# Patient Record
Sex: FEMALE | Race: WHITE
[De-identification: names, ages, dates, MRNs, and addresses within clinical notes are randomized per-mention and may not be internally consistent; named-entity substitution may affect disease eponyms.]

---

## 2017-10-03 ENCOUNTER — HOSPITAL ENCOUNTER (OUTPATIENT)
Dept: HOSPITAL 80 - FIMAGING | Age: 72
End: 2017-10-03
Attending: INTERNAL MEDICINE
Payer: COMMERCIAL

## 2017-10-03 DIAGNOSIS — Z12.31: Primary | ICD-10-CM

## 2017-10-03 PROCEDURE — G0202 SCR MAMMO BI INCL CAD: HCPCS

## 2017-11-01 ENCOUNTER — HOSPITAL ENCOUNTER (OUTPATIENT)
Dept: HOSPITAL 80 - FED | Age: 72
Setting detail: OBSERVATION
LOS: 1 days | Discharge: HOME | End: 2017-11-02
Attending: FAMILY MEDICINE | Admitting: FAMILY MEDICINE
Payer: COMMERCIAL

## 2017-11-01 DIAGNOSIS — I10: ICD-10-CM

## 2017-11-01 DIAGNOSIS — G45.4: Primary | ICD-10-CM

## 2017-11-01 LAB
INR PPP: 0.97 (ref 0.83–1.16)
PLATELET # BLD: 219 10^3/UL (ref 150–400)
PROTHROMBIN TIME: 12.8 SEC (ref 12–15)

## 2017-11-01 PROCEDURE — G0378 HOSPITAL OBSERVATION PER HR: HCPCS

## 2017-11-01 PROCEDURE — 93005 ELECTROCARDIOGRAM TRACING: CPT

## 2017-11-01 PROCEDURE — 70551 MRI BRAIN STEM W/O DYE: CPT

## 2017-11-01 PROCEDURE — 93880 EXTRACRANIAL BILAT STUDY: CPT

## 2017-11-01 PROCEDURE — 93306 TTE W/DOPPLER COMPLETE: CPT

## 2017-11-01 PROCEDURE — 70450 CT HEAD/BRAIN W/O DYE: CPT

## 2017-11-01 NOTE — PDGENHP
History and Physical





- Chief Complaint


acute memory loss





- History of Present Illness


Patient is a 72-year-old female with hx of HLD who presented to the emergency 

department with acute short term memory problems and feeling of being lost 

while gardening today. she could not remember what she was doing or why she was 

there. She had no word-finding difficulties.  She had normal speech.  She had 

no weakness or numbness.  No visual change or dizziness.  No headache or neck 

pain.  No recent fall or trauma.  She took 2 Aspirin prior to arrival to the 

E.D. 





In the E.D. her symptoms persisted. CT brain was unremarkable for acute 

findings. An MRI of the brain is pending. She has no other neuro deficits. No 

hx of Afib. 





She has no difficulty with swallowing.





Neuro has been consulted by the E.D





she is being admitted for further w/u.





Denies CP, palpitations, leg swelling, SOB, N/V





PMhx: hypercholesterolemia





Social history: non smoker. 





FmHx: NC





History Information





- Allergies/Home Medication List


Allergies/Adverse Reactions: 








No Known Allergies Allergy (Verified 11/01/17 14:19)


 





Home Medications: 








Multivitamins [Multivitamin (*)] 1 each PO DAILY 11/01/17 [Last Taken 2 Weeks 

Ago ~10/18/17]


Rosuvastatin Calcium [Crestor 10mg (RX)] 10 mg PO HS 11/01/17 [Last Taken 10/31/

17]





I have personally reviewed and updated: medical history, social history





- Social History


Smoking Status: Never smoked





Review of Systems


Review of Systems: 





ROS: 10pt was reviewed & negative except for what was stated in HPI & below





Physical Exam


Physical Exam: 

















Temp Pulse Resp BP Pulse Ox


 


 36.5 C   62   16   152/92 H  95 


 


 11/01/17 14:31  11/01/17 15:39  11/01/17 15:39  11/01/17 15:39  11/01/17 15:39











Constitutional: no apparent distress, appears nourished


Eyes: PERRL, EOMI


Ears, Nose, Mouth, Throat: moist mucous membranes, hearing normal


Cardiovascular: regular rate and rhythym, no murmur, rub, or gallop, No edema


Respiratory: no respiratory distress, no rales or rhonchi, clear to auscultation


Gastrointestinal: normoactive bowel sounds, soft, non-tender abdomen


Skin: warm


Neurologic: AAOx3, CN II-XII Intact, No facial droop


Psychiatric: interacting appropriately, not anxious, not encephalopathic, 

thought process linear, encephalopathic





Lab Data & Imaging Review





 11/01/17 14:20





 11/01/17 14:20














WBC  9.01 10^3/uL (3.80-9.50)   11/01/17  14:20    


 


RBC  4.70 10^6/uL (4.18-5.33)   11/01/17  14:20    


 


Hgb  14.9 g/dL (12.6-16.3)   11/01/17  14:20    


 


POC Hgb  15.3 gm/dL (12.6-16.3)   11/01/17  14:27    


 


Hct  42.1 % (38.0-47.0)   11/01/17  14:20    


 


POC Hct  45 % (38-47)   11/01/17  14:27    


 


MCV  89.6 fL (81.5-99.8)   11/01/17  14:20    


 


MCH  31.7 pg (27.9-34.1)   11/01/17  14:20    


 


MCHC  35.4 g/dL (32.4-36.7)   11/01/17  14:20    


 


RDW  11.6 % (11.5-15.2)   11/01/17  14:20    


 


Plt Count  219 10^3/uL (150-400)   11/01/17  14:20    


 


MPV  9.4 fL (8.7-11.7)   11/01/17  14:20    


 


Neut % (Auto)  64.1 % (39.3-74.2)   11/01/17  14:20    


 


Lymph % (Auto)  30.0 % (15.0-45.0)   11/01/17  14:20    


 


Mono % (Auto)  4.2 % (4.5-13.0)  L  11/01/17  14:20    


 


Eos % (Auto)  1.0 % (0.6-7.6)   11/01/17  14:20    


 


Baso % (Auto)  0.4 % (0.3-1.7)   11/01/17  14:20    


 


Nucleat RBC Rel Count  0.0 % (0.0-0.2)   11/01/17  14:20    


 


Absolute Neuts (auto)  5.77 10^3/uL (1.70-6.50)   11/01/17  14:20    


 


Absolute Lymphs (auto)  2.70 10^3/uL (1.00-3.00)   11/01/17  14:20    


 


Absolute Monos (auto)  0.38 10^3/uL (0.30-0.80)   11/01/17  14:20    


 


Absolute Eos (auto)  0.09 10^3/uL (0.03-0.40)   11/01/17  14:20    


 


Absolute Basos (auto)  0.04 10^3/uL (0.02-0.10)   11/01/17  14:20    


 


Absolute Nucleated RBC  0.00 10^3/uL (0-0.01)   11/01/17  14:20    


 


Immature Gran %  0.3 % (0.0-1.1)   11/01/17  14:20    


 


Immature Gran #  0.03 10^3/uL (0.00-0.10)   11/01/17  14:20    


 


PT  12.8 SEC (12.0-15.0)   11/01/17  14:20    


 


INR  0.97  (0.83-1.16)   11/01/17  14:20    


 


APTT  26.2 SEC (23.0-38.0)   11/01/17  14:20    


 


POC Sodium  141 mEq/L (134-144)   11/01/17  14:27    


 


Sodium  141 mEq/L (134-144)   11/01/17  14:20    


 


POC Potassium  3.3 mEq/L (3.3-5.0)   11/01/17  14:27    


 


Potassium  3.6 mEq/L (3.5-5.2)   11/01/17  14:20    


 


POC Chloride  105 mEq/L ()   11/01/17  14:27    


 


Chloride  104 mEq/L ()   11/01/17  14:20    


 


Carbon Dioxide  23 mEq/l (22-31)   11/01/17  14:20    


 


Anion Gap  14 mEq/L (8-16)   11/01/17  14:20    


 


POC BUN  17 mg/dL (7-23)   11/01/17  14:27    


 


BUN  16 mg/dL (7-23)   11/01/17  14:20    


 


Creatinine  0.8 mg/dL (0.6-1.0)   11/01/17  14:20    


 


POC Creatinine  0.9 mg/dL (0.6-1.0)   11/01/17  14:27    


 


Estimated GFR  > 60   11/01/17  14:20    


 


Glucose  129 mg/dL ()  H  11/01/17  14:20    


 


POC Glucose  128 mg/dL ()  H  11/01/17  14:27    


 


Calcium  9.7 mg/dL (8.5-10.4)   11/01/17  14:20    


 


Troponin I  < 0.012 ng/mL (0.000-0.034)   11/01/17  14:20    











Assessment & Plan


Assessment: 








#Acute short term memory loss


#?TIA


#HLD





Plan:


-Neuro to consult, await reccs


-Await MRI


-TTE


-Corotid US


-Check RF, lipid panel


-cont Statin


-Schedule low dose Aspirin


-Slight HTN noted, will allow permissive HTN


-Telemetry


-SCD's

## 2017-11-01 NOTE — EDPHY
H & P


Time Seen by Provider: 11/01/17 14:34


HPI/ROS: 





CHIEF COMPLAINT:  Memory difficulties





HISTORY OF PRESENT ILLNESS:  Patient is a 72-year-old female who was treated 

for high cholesterol who presents emergency department with memory 

difficulties.  The patient states that she was outside with her .  She 

came to him and stated that she felt "funny."  He states that she seemed 

slightly "foggy."  Per report, she had no word-finding difficulties.  She had 

normal speech.  She had no weakness or numbness.  No visual change or 

dizziness.  No headache or neck pain.  No recent fall or trauma.  Patient 

states that she feels as though she is having difficulty remembering things.  

She took an aspirin but then could not remember that she took an aspirin and 

took a 2nd tablet.  No previous stroke.





REVIEW OF SYSTEMS:  


My complete review of systems is negative except as mentioned in the HPI.


Past Medical/Surgical History: 





Includes hypercholesterolemia





Past surgical history:  Noncontributory





Social history:  The patient is here with her .  She does not smoke.


Physical Exam: 





Vitals noted


GENERAL:  Well-appearing, in no acute distress, alert.


HEENT:  Eyes normal to inspection, normal pharynx, no signs of dehydration.


NECK:  No thyromegaly, no lymphadenopathy, supple.


RESPIRATORY:  Clear to auscultation bilaterally, no rales, rhonchi or wheezing.


CVS:  Regular rate and rhythm, no rubs, murmurs, or gallops.


ABDOMEN:  Soft, nontender, nondistended, no organomegaly.


BACK:  Normal to inspection, no CVA tenderness.


SKIN:  Normal color, no rash, warm, dry.  No pallor.


EXTREMITIES:  No pedal edema, no calf tenderness, no Homans sign or cords, no 

joint swelling.


NEURO/PSYCH:  


Higher functions:  Alert and Oriented x3.  Normal speech and cognition.  Normal 

mood and affect.  Normal memory 3/3


Cranial nerves:  Normal as tested.


Cerebellar:  Normal as tested.  Good finger to nose, good heel-to-shin, normal 

gait.


Peripheral exam:  Normal motor exam.  Normal sensation.  Normal reflexes.





NIHSS = 0


Constitutional: 


 Initial Vital Signs











Temperature (C)  36.5 C   11/01/17 14:31


 


Heart Rate  71   11/01/17 14:31


 


Respiratory Rate  16   11/01/17 14:31


 


Blood Pressure  184/96 H  11/01/17 14:31


 


O2 Sat (%)  97   11/01/17 14:31








 











O2 Delivery Mode               Room Air














Allergies/Adverse Reactions: 


 





No Known Allergies Allergy (Verified 11/01/17 14:19)


 








Home Medications: 














 Medication  Instructions  Recorded


 


Multivitamins [Multivitamin (*)] 1 each PO DAILY 11/01/17


 


Rosuvastatin Calcium [Crestor 10mg 10 mg PO HS 11/01/17





(RX)]  














Medical Decision Making





- Diagnostics


Imaging Results: 


 Imaging Impressions





Head CT  11/01/17 14:45


Impression:


1. No acute intracranial findings.


2. Diffuse cerebral atrophy with periventricular and subcortical low 

attenuation consistent with chronic microvascular ischemic gliosis.


 


Findings discussed with AARON JAQUELINE CAPELLAN 11/1/2017 at 15:30. 


 


 








Brain MRI  11/01/17 15:33


Impression:  


1. No acute intracranial findings.


2. Atrophy with white matter change most likely related to chronic 

microvascular ischemic gliosis.


 


Findings discussed with AARON JAQUELINE CAPELLAN 11/1/2017 at 17:05. 


 


 











ED Course/Re-evaluation: 





I met the patient on arrival.  I performed her initial evaluation.  Her NIHSS = 

0.  I did not call a stroke alert as she had a normal neuro exam.  I discussed 

the plan with the patient and her .  I answered all her questions.  An 

IV was placed.  Laboratory studies and EKG were obtained.  Head CT without IV 

contrast was ordered.





I-STAT:  Sodium 141, potassium 3.3, glucose 128, creatinine 0.9, hematocrit 45





IV patient's laboratory studies.





15 30:  Head CT:  No acute disease.  Please refer the dictated report by Dr. Cervantes.





I discussed the results with the patient.  I answered all her questions.  On 

recheck she had no focal neurologic deficits.  An MRI was ordered.





Sinus rhythm at 60. Left axis deviation.





I discussed the case with Dr. Pappas.  I also discussed the case with Dr. Owen 

from Neurology.





17 20:  I discussed case with Radiology.  No acute disease noted on the MRI.


Differential Diagnosis: 





My differential includes but is not limited to ischemic CVA, hemorrhagic CVA, 

transient global amnesia, TIA, dissection, aneurysm, electrolyte abnormality, 

sugar abnormality, ACS





- Data Points


Laboratory Results: 


 Laboratory Results





 11/01/17 14:20 





 11/01/17 14:20 





 











  11/01/17 11/01/17 11/01/17





  14:27 14:20 14:20


 


WBC      





    


 


RBC      





    


 


Hgb      





    


 


POC Hgb  15.3 gm/dL gm/dL    





   (12.6-16.3)   


 


Hct      





    


 


POC Hct  45 % %    





   (38-47)   


 


MCV      





    


 


MCH      





    


 


MCHC      





    


 


RDW      





    


 


Plt Count      





    


 


MPV      





    


 


Neut % (Auto)      





    


 


Lymph % (Auto)      





    


 


Mono % (Auto)      





    


 


Eos % (Auto)      





    


 


Baso % (Auto)      





    


 


Nucleat RBC Rel Count      





    


 


Absolute Neuts (auto)      





    


 


Absolute Lymphs (auto)      





    


 


Absolute Monos (auto)      





    


 


Absolute Eos (auto)      





    


 


Absolute Basos (auto)      





    


 


Absolute Nucleated RBC      





    


 


Immature Gran %      





    


 


Immature Gran #      





    


 


PT      12.8 SEC SEC





     (12.0-15.0) 


 


INR      0.97 





     (0.83-1.16) 


 


APTT      26.2 SEC SEC





     (23.0-38.0) 


 


POC Sodium  141 mEq/L mEq/L    





   (134-144)   


 


Sodium    141 mEq/L mEq/L  





    (134-144)  


 


POC Potassium  3.3 mEq/L mEq/L    





   (3.3-5.0)   


 


Potassium    3.6 mEq/L mEq/L  





    (3.5-5.2)  


 


POC Chloride  105 mEq/L mEq/L    





   ()   


 


Chloride    104 mEq/L mEq/L  





    ()  


 


Carbon Dioxide    23 mEq/l mEq/l  





    (22-31)  


 


Anion Gap    14 mEq/L mEq/L  





    (8-16)  


 


POC BUN  17 mg/dL mg/dL    





   (7-23)   


 


BUN    16 mg/dL mg/dL  





    (7-23)  


 


Creatinine    0.8 mg/dL mg/dL  





    (0.6-1.0)  


 


POC Creatinine  0.9 mg/dL mg/dL    





   (0.6-1.0)   


 


Estimated GFR    > 60   





    


 


Glucose    129 mg/dL H mg/dL  





    ()  


 


POC Glucose  128 mg/dL H mg/dL    





   ()   


 


Calcium    9.7 mg/dL mg/dL  





    (8.5-10.4)  


 


Troponin I    < 0.012 ng/mL ng/mL  





    (0.000-0.034)  














  11/01/17





  14:20


 


WBC  9.01 10^3/uL 10^3/uL





   (3.80-9.50) 


 


RBC  4.70 10^6/uL 10^6/uL





   (4.18-5.33) 


 


Hgb  14.9 g/dL g/dL





   (12.6-16.3) 


 


POC Hgb  





  


 


Hct  42.1 % %





   (38.0-47.0) 


 


POC Hct  





  


 


MCV  89.6 fL fL





   (81.5-99.8) 


 


MCH  31.7 pg pg





   (27.9-34.1) 


 


MCHC  35.4 g/dL g/dL





   (32.4-36.7) 


 


RDW  11.6 % %





   (11.5-15.2) 


 


Plt Count  219 10^3/uL 10^3/uL





   (150-400) 


 


MPV  9.4 fL fL





   (8.7-11.7) 


 


Neut % (Auto)  64.1 % %





   (39.3-74.2) 


 


Lymph % (Auto)  30.0 % %





   (15.0-45.0) 


 


Mono % (Auto)  4.2 % L %





   (4.5-13.0) 


 


Eos % (Auto)  1.0 % %





   (0.6-7.6) 


 


Baso % (Auto)  0.4 % %





   (0.3-1.7) 


 


Nucleat RBC Rel Count  0.0 % %





   (0.0-0.2) 


 


Absolute Neuts (auto)  5.77 10^3/uL 10^3/uL





   (1.70-6.50) 


 


Absolute Lymphs (auto)  2.70 10^3/uL 10^3/uL





   (1.00-3.00) 


 


Absolute Monos (auto)  0.38 10^3/uL 10^3/uL





   (0.30-0.80) 


 


Absolute Eos (auto)  0.09 10^3/uL 10^3/uL





   (0.03-0.40) 


 


Absolute Basos (auto)  0.04 10^3/uL 10^3/uL





   (0.02-0.10) 


 


Absolute Nucleated RBC  0.00 10^3/uL 10^3/uL





   (0-0.01) 


 


Immature Gran %  0.3 % %





   (0.0-1.1) 


 


Immature Gran #  0.03 10^3/uL 10^3/uL





   (0.00-0.10) 


 


PT  





  


 


INR  





  


 


APTT  





  


 


POC Sodium  





  


 


Sodium  





  


 


POC Potassium  





  


 


Potassium  





  


 


POC Chloride  





  


 


Chloride  





  


 


Carbon Dioxide  





  


 


Anion Gap  





  


 


POC BUN  





  


 


BUN  





  


 


Creatinine  





  


 


POC Creatinine  





  


 


Estimated GFR  





  


 


Glucose  





  


 


POC Glucose  





  


 


Calcium  





  


 


Troponin I  





  











Medications Given: 


 








Discontinued Medications





Sodium Chloride (Ns)  500 mls @ 500 mls/hr IV EDNOW ONE


   PRN Reason: Protocol


   Stop: 11/01/17 15:44


   Last Admin: 11/01/17 15:35 Dose:  500 mls





Point of Care Test Results: 


 











  11/01/17





  14:27


 


POC Sodium  141


 


POC Potassium  3.3


 


POC Chloride  105


 


POC BUN  17


 


POC Creatinine  0.9


 


POC Glucose  128 H














Departure





- Departure


Disposition: Home, Routine, Self-Care


Clinical Impression: 


 Memory loss





Condition: Good

## 2017-11-01 NOTE — CPEKG
Heart Rate: 60

RR Interval: 1000

P-R Interval: 144

QRSD Interval: 92

QT Interval: 436

QTC Interval: 436

P Axis: 45

QRS Axis: -31

T Wave Axis: -18

EKG Severity - BORDERLINE ECG -

EKG Impression: SINUS RHYTHM

EKG Impression: LEFT AXIS DEVIATION

EKG Impression: BORDERLINE T ABNORMALITIES, DIFFUSE LEADS

Electronically Signed By: Barbara Ray 01-Nov-2017 20:57:49

## 2017-11-02 VITALS — OXYGEN SATURATION: 94 % | RESPIRATION RATE: 14 BRPM

## 2017-11-02 VITALS — SYSTOLIC BLOOD PRESSURE: 115 MMHG | TEMPERATURE: 98 F | HEART RATE: 58 BPM | DIASTOLIC BLOOD PRESSURE: 67 MMHG

## 2017-11-02 NOTE — ECHO
[email or web address]





[address] [address] 

Main: [phone] 



Fax: 



Transthoracic Echocardiogram 

Name:             ZARI PANDYA                           MR#:

[id number]

Study Date:       2017                              Study Time:

11:27 AM

YOB: 1945                              Age:

72 year(s)

Height:           160 cm (63 in.)                         Weight:

56.7 kg (125 lb.)

BSA:              1.58 m2                                 Gender:

Female

Examination:      Echo with Agitated Saline               Indication:

ischemic stroke; r/o PFO

Image Quality:    Adequate                                Contrast:

I.V. dose of agitated saline

Requested by:     Keagan Pappas                           BP:

/

Heart Rate:                                               Rhythm: 

Indication:       ischemic stroke; r/o PFO 



Procedure Staff 

Ultrasound Technician:   Desi Velasquez Physician:       Bassam Beckham 

Requesting Provider: 



Conclusions:           Normal study 



Measurements: 

Chambers                     Valvular Assessment AV/MV

Valvular Assessment TV/PV



Normal                                    Normal

Normal

Name         Value     Range              Name         Value Range

Name           Value Range

Ao Elena (MM): 1.8 cm    (2.2 cm-3.7            AV Vmax:     1.59 m/s (1

m/s-1.7        PV Vmax:       0.85 m/s (0.6 m/s-0.9



cm)                                   m/s)

m/s)

IVSd (2D):   0.9 cm (0.6 cm-1.1               AV maxPG:    10 mmHg ( -

)          PV PGmax:      3  mmHg ( - )



cm)                LVOT Vmax:   1.28 m/s (0.7 m/s-1.1 

LVDd (2D):   3.8 cm    (3.9 cm-5.3                               m/s)





cm)                MV E Vmax:   0.77 m/s ( - )  

LVDs (2D):   2.3 cm    (2.1 cm-4              MV A Vmax:   0.82 m/s (

- )



cm)                MV E/A:      0.94  ( - )  

LVPWd (2D):  0.8 cm    ( - )   

LVEF (BP):   73 %      (>=55 %)   

RVDd(2D):    2.4 cm    (1.9 cm-3.8 



cmmm)  



Continued Measurements: 

Chambers                     Valvular Assessment AV/MV

Valvular Assessment TV/PV



Name                     Value            Name

Value      Name                    Value

LADs Lon.1 cm                MV DecTime:

222 m/s       CVP (est.):             5 mmHg

LA Area:                8.5 cm2           MV E' Septal:         0.09

m/s

LA Volume:              20 ml             MV E/E' Septal:       8.60



LA Volume Index:        12.7 ml/m2        MV E/E' Lateral:      7.80





Patient: ZARI PANDYA                         MRN: [id number]

Study Date: 2017   Page 1 of 2

11:27 AM 









Findings: Left Ventricle: 

Normal size left ventricle. No LV hypertrophy. Normal global systolic

LV function. EF is 73 %. No

regional wall motion abnormality. Normal diastolic LV function.  

Right Ventricle: 

Normal size right ventricle. Normal RV function.  

Left Atrium: 

The left atrium is normal in size. An agitated saline study was

performed and was negative for

intracardiac shunting.  

Right Atrium: 

The right atrium is normal in size.  

Mitral Valve: 

The mitral valve is normal in appearance and function. There is no

mitral valve regurgitation.

Aortic Valve: 

The aortic valve is normal in appearance and function. There is no

aortic valve regurgitation. No

aortic valve stenosis is present.  

Tricuspid Valve: 

The tricuspid valve is normal in appearance and function. There is no

significant tricuspid valve

regurgitation. Pulmonary artery pressure is not obtained due to

inadequate TR jet.

Pulmonic Valve: 

The pulmonic valve is normal in appearance and function.  

Aorta: 

The aorta is normal. Normal size aortic root measuring 1.8 cm.  

Pericardium: 

No pericardial effusion. 







Electronically signed by Bassam Beckham on 2017 at 12:18 PM 

(No Signature Object) 



Patient: ZARI PANDYA                         MRN: [id number]

Study Date: 2017   Page 2 of 2

11:27 AM 







[id number]

## 2017-11-02 NOTE — ASMTCASEMG
Living Arrangements

 

What is your living           Answers:  With Spouse                           

arrangement? Who do you                                                       

live with?                                                                    

Type Of Residence

 

What kind of residence do     Answers:  House                                 

you live in?                                                                  

Discharge Plan Comments

 

Coordination Status           

Comments                      

Notes:

Pt has been cleared from neurology to d/c home. CM spoke w/ PT and pt can go home independent. CM 

met w/ pt dispo planning. Pt does not have any needs at this time. CM available for d/c needs. 

 

Date Signed:  11/02/2017 11:42 AM

Electronically Signed By:RK Ballesteros

## 2017-11-02 NOTE — ECHO
[email or web address]





[address] [address] 

Main: [phone] 



Fax: 



Transthoracic Echocardiogram 

Name:             ZARI PANDYA                           MR#:

[id number]

Study Date:       2017                              Study Time:

11:27 AM

YOB: 1945                              Age:

72 year(s)

Height:           160 cm (63 in.)                         Weight:

56.7 kg (125 lb.)

BSA:              1.58 m2                                 Gender:

Female

Examination:      Echo with Agitated Saline               Indication:

ischemic stroke; r/o PFO

Image Quality:    Adequate                                Contrast:

I.V. dose of agitated saline

Requested by:     Keagan Pappas                           BP:

/

Heart Rate:                                               Rhythm: 

Indication:       ischemic stroke; r/o PFO 



Procedure Staff 

Ultrasound Technician:   Desi Velasquez Physician:       Bassam Beckham 

Requesting Provider: 



Conclusions:           Normal study 



Measurements: 

Chambers                     Valvular Assessment AV/MV

Valvular Assessment TV/PV



Normal                                    Normal

Normal

Name         Value     Range              Name         Value Range

Name           Value Range

Ao Elena (MM): 1.8 cm    (2.2 cm-3.7            AV Vmax:     1.59 m/s (1

m/s-1.7        PV Vmax:       0.85 m/s (0.6 m/s-0.9



cm)                                   m/s)

m/s)

IVSd (2D):   0.9 cm (0.6 cm-1.1               AV maxPG:    10 mmHg ( -

)          PV PGmax:      3  mmHg ( - )



cm)                LVOT Vmax:   1.28 m/s (0.7 m/s-1.1 

LVDd (2D):   3.8 cm    (3.9 cm-5.3                               m/s)





cm)                MV E Vmax:   0.77 m/s ( - )  

LVDs (2D):   2.3 cm    (2.1 cm-4              MV A Vmax:   0.82 m/s (

- )



cm)                MV E/A:      0.94  ( - )  

LVPWd (2D):  0.8 cm    ( - )   

LVEF (BP):   73 %      (>=55 %)   

RVDd(2D):    2.4 cm    (1.9 cm-3.8 



cmmm)  



Continued Measurements: 

Chambers                     Valvular Assessment AV/MV

Valvular Assessment TV/PV



Name                     Value            Name

Value      Name                    Value

LADs Lon.1 cm                MV DecTime:

222 m/s       CVP (est.):             5 mmHg

LA Area:                8.5 cm2           MV E' Septal:         0.09

m/s

LA Volume:              20 ml             MV E/E' Septal:       8.60



LA Volume Index:        12.7 ml/m2        MV E/E' Lateral:      7.80





Patient: ZARI PANDYA                         MRN: [id number]

Study Date: 2017   Page 1 of 2

11:27 AM 









Findings: Left Ventricle: 

Normal size left ventricle. No LV hypertrophy. Normal global systolic

LV function. EF is 73 %. No

regional wall motion abnormality. Normal diastolic LV function.  

Right Ventricle: 

Normal size right ventricle. Normal RV function.  

Left Atrium: 

The left atrium is normal in size. An agitated saline study was

performed and was negative for

intracardiac shunting.  

Right Atrium: 

The right atrium is normal in size.  

Mitral Valve: 

The mitral valve is normal in appearance and function. There is no

mitral valve regurgitation.

Aortic Valve: 

The aortic valve is normal in appearance and function. There is no

aortic valve regurgitation. No

aortic valve stenosis is present.  

Tricuspid Valve: 

The tricuspid valve is normal in appearance and function. There is no

significant tricuspid valve

regurgitation. Pulmonary artery pressure is not obtained due to

inadequate TR jet.

Pulmonic Valve: 

The pulmonic valve is normal in appearance and function.  

Aorta: 

The aorta is normal. Normal size aortic root measuring 1.8 cm.  

Pericardium: 

No pericardial effusion. 







Electronically signed by Bassam Beckham on 2017 at 12:18 PM 

(No Signature Object) 



Patient: ZARI PANDYA                         MRN: [id number]

Study Date: 2017   Page 2 of 2

11:27 AM 







[id number]

## 2017-11-02 NOTE — GDS
[f 
rep st]



                                                             DISCHARGE SUMMARY





DISCHARGE DIAGNOSIS:  

1.  Transient global amnesia.

2.  Hypertension.



BRIEF HISTORY:  The patient is a 72-year-old female with a history of 
hyperlipidemia.  She presented to the emergency department with acute short-
term memory problems and felt like she was lost while gardening.  She did not 
remember what she was doing or why she was there.  She had no word-finding 
difficulties.  She was seen and evaluated by Neurology who noted that she has 
transient global amnesia.  An MRI of her brain showed nothing acute.  Her 
carotid Doppler showed no flow-limiting stenosis.  Recommendations are no 
driving x2 week and seizure precautions.



HOSPITAL COURSE BY PROBLEM:  

1.  Transient global amnesia.  Her symptoms have resolved.  On the telemetry 
monitor, she has been in sinus rhythm.  To follow up with Dr. Kelly and get an 
EEG.

2.  Hypertension.  Follow up with her PCP.  It is stable this afternoon.



DISCHARGE CONDITION:  Stable. 



VITAL SIGNS: Blood pressure is 115/67, respiratory rate is 14, pulse is 58, 
temperature 36.7 Celsius, O2 sats on room air 94%.



MEDICATIONS AT DISCHARGE:  Please see the EMR.



DISCHARGE INSTRUCTIONS:  

1.  No driving x2 weeks.

2.  To take seizure precautions.  For example, no climbing up ladders and 
swimming alone.

3.  To follow up with Dr. Kelly.  She will need EEG evaluation.



Copy requested to:

Dr. Robin Omalley



Job #:  [id number]

MTDD

## 2017-11-02 NOTE — HOSPPROG
Hospitalist Progress Note


Assessment/Plan: 





Patient is a 72-year-old female with hx of HLD who presented to the emergency 

department with acute short term memory problems and feeling of being lost 

while gardening today. She could not remember what she was doing or why she was 

there. She had no word-finding difficulties.  She had normal speech.  She had 

no weakness or numbness.  No visual change or dizziness.  No headache or neck 

pain.  No recent fall or trauma.  She took 2 Aspirin prior to arrival to the 

E.D. Today is my first encounter w the patient/ chart reviewed. Reviewed her 

care with Dr Kelly.





*Transient global amnesia


   MRI of brain shows nothing acute


   carotid dopplers show no flow limiting stenosis


   recommendation is no driving x 2 weeks and seizure precautions


   to f/u with Dr Kelly and get an EEG 


   reviewed telemetry monitor/ she has been in sinus/ no afib or ectopy





*HTN


   should f/u with PCP


   stable this afternooon





*Plan: dc home





Subjective: Marti has no complaints.


Objective: 


 Vital Signs











Temp Pulse Resp BP Pulse Ox


 


 36.7 C   58 L  14   115/67   94 


 


 11/02/17 10:54  11/02/17 11:02  11/02/17 10:54  11/02/17 11:02  11/02/17 10:54








 











 11/01/17 11/02/17 11/03/17





 05:59 05:59 05:59


 


Intake Total  450 500


 


Balance  450 500








 











PT  12.8 SEC (12.0-15.0)   11/01/17  14:20    


 


INR  0.97  (0.83-1.16)   11/01/17  14:20    














- Physical Exam


Constitutional: no apparent distress, appears nourished, not in pain


Eyes: PERRL


Ears, Nose, Mouth, Throat: hearing normal


Cardiovascular: regular rate and rhythym


Respiratory: no respiratory distress


Skin: warm


Musculoskeletal: full muscle strength


Neurologic: AAOx3


Psychiatric: interacting appropriately





ICD10 Worksheet


Patient Problems: 


 Problems











Problem Status Onset


 


Memory loss Acute

## 2017-11-02 NOTE — GCON
[f 
rep st]



                                                                    CONSULTATION





NEUROLOGY CONSULTATION



DATE OF CONSULTATION:  11/02/2017



REFERRING PHYSICIAN:  Keagan Pappas MD





BILLING INFORMATION: seventy  total minutes on the floor today and reviewing 
records, neuro imaging and in direct counseling with the patient along with 
coordination of care.



CHIEF COMPLAINT:  Amnesia.



HISTORY OF PRESENT ILLNESS:  The patient is a very pleasant 72-year-old lady 
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 



She recently came home from a trip from [place] and states she was 
dehydrated.  Then yesterday morning, she and her  were working quite 
vigorously with an orbital elmira to sand off the paint from their external 
home to repaint it themselves.  She states she was applying a great deal of 
pressure with her chest and upper extremities while standing on a ladder using 
her core muscles along with repetitive kneeling and standing up.  Certainly, 
she had repetitive Valsalva type maneuvers. 



Then, a little later in the morning, she began having a feeling of "fuzziness" 
and minimal headache symptoms.  Her  then noticed that the patient was 
unable to form new memories for a period of around 3-5 hours in total.  She 
would repeat questions such as why are we having lunch although they just 
spoken about it a moment previously and repeat the same questions over and 
over.  At no point - did she have loss or alteration of consciousness.



She was brought to the ER and, after 1 or 2 hours in the ER, the symptoms 
completely resolved.  There was never any focal weakness, sensory loss, 
language or motor speech disturbance at any time during these events.  The 
patient had an MRI brain which showed no acute findings.  No acute infarct or 
hemorrhage.  The brain looks fairly healthy in terms of microvascular changes, 
there is minimal.  She had a carotid ultrasound which showed no flow-limiting 
stenosis in the carotid system.  There is some minimal plaque.  She is 
essentially resolved overnight and doing well.



REVIEW OF SYSTEMS:  Ten-point review of system was done and only pertinent to 
HPI.



PAST MEDICAL HISTORY:  Please see Dr. Pappas's history and physical.



SOCIAL HISTORY:  Please see Dr. Pappas's history and physical.



HOME MEDICATIONS:  Please see Dr. Pappas's history and physical.



ALLERGIES:  Please see Dr. Pappas's history and physical



PHYSICAL EXAMINATION:  VITAL SIGNS:  Blood pressure is 127/77, temperature 36.9
, heart rate is 64, O2 saturation is 95%.  GENERAL:  In no acute distress.  
Very pleasant.  NEUROLOGIC:  Higher mental function.  She names 5/5, follows 
commands 5/5, and repeats 5/5.  No aphasia.  She is lucid.  Cranial nerve exam 
normal 2 through 7, 11, and 12.  No dysarthria or any other abnormal findings.  
Motor exam normal strength tone and DTRs throughout.  Sensory normal to light 
touch throughout.  Coordination normal.



IMPRESSION AND PLAN:  



1. Transient global amnesia. 



The patient's clinical history best fits with transient global amnesia (TGA).  
I am reassured that there were no focal motor, sensory, language or motor 
speech symptoms to suggest an acute neurovascular syndrome.  Moreover, her MRI 
brain was negative in terms of diffusion-weighted images hours into symptom 
onset.  All of this would support the diagnosis of transient global amnesia.  
She has no neurologic history such as seizures or migraines.  Her activity of 
vigorous work, sanding the paint from their home is not an uncommon historical 
feature preceding an episode of transient global amnesia.  She was counseled to 
such.  We will put her on 2 weeks of driving restrictions and seizure 
precautions for safety.  I will arrange for an outpatient EEG and follow up 
after that study is complete to review the results.  We will do this to exclude 
any epileptiform abnormalities.  From my standpoint, she can discharge from the 
hospital any time back to home.  We will arrange outpatient followup.  Thank 
for this consultation.



The patient will have close follow-up with PCP as well to monitor her blood 
pressures.  Her higher blood pressures in the ED may have been reactive rather 
than causative.  But certainly this needs to be followed up on a long-term 
basis with her primary care physician.  She is agreeable.



Job #:  [id number]

MTDD

## 2017-11-02 NOTE — ECHO
[email or web address]





[address] [address] 

Main: [phone] 



Fax: 



Transthoracic Echocardiogram 

Name:             ZARI PANDYA                           MR#:

[id number]

Study Date:       2017                              Study Time:

11:27 AM

YOB: 1945                              Age:

72 year(s)

Height:           160 cm (63 in.)                         Weight:

56.7 kg (125 lb.)

BSA:              1.58 m2                                 Gender:

Female

Examination:      Echo with Agitated Saline               Indication:

ischemic stroke; r/o PFO

Image Quality:    Adequate                                Contrast:

I.V. dose of agitated saline

Requested by:     Keagan Pappas                           BP:

/

Heart Rate:                                               Rhythm: 

Indication:       ischemic stroke; r/o PFO 



Procedure Staff 

Ultrasound Technician:   Desi Velasquez Physician:       Bassam Beckham 

Requesting Provider: 



Conclusions:           Normal study 



Measurements: 

Chambers                     Valvular Assessment AV/MV

Valvular Assessment TV/PV



Normal                                    Normal

Normal

Name         Value     Range              Name         Value Range

Name           Value Range

Ao Elena (MM): 1.8 cm    (2.2 cm-3.7            AV Vmax:     1.59 m/s (1

m/s-1.7        PV Vmax:       0.85 m/s (0.6 m/s-0.9



cm)                                   m/s)

m/s)

IVSd (2D):   0.9 cm (0.6 cm-1.1               AV maxPG:    10 mmHg ( -

)          PV PGmax:      3  mmHg ( - )



cm)                LVOT Vmax:   1.28 m/s (0.7 m/s-1.1 

LVDd (2D):   3.8 cm    (3.9 cm-5.3                               m/s)





cm)                MV E Vmax:   0.77 m/s ( - )  

LVDs (2D):   2.3 cm    (2.1 cm-4              MV A Vmax:   0.82 m/s (

- )



cm)                MV E/A:      0.94  ( - )  

LVPWd (2D):  0.8 cm    ( - )   

LVEF (BP):   73 %      (>=55 %)   

RVDd(2D):    2.4 cm    (1.9 cm-3.8 



cmmm)  



Continued Measurements: 

Chambers                     Valvular Assessment AV/MV

Valvular Assessment TV/PV



Name                     Value            Name

Value      Name                    Value

LADs Lon.1 cm                MV DecTime:

222 m/s       CVP (est.):             5 mmHg

LA Area:                8.5 cm2           MV E' Septal:         0.09

m/s

LA Volume:              20 ml             MV E/E' Septal:       8.60



LA Volume Index:        12.7 ml/m2        MV E/E' Lateral:      7.80





Patient: ZARI PANDYA                         MRN: [id number]

Study Date: 2017   Page 1 of 2

11:27 AM 









Findings: Left Ventricle: 

Normal size left ventricle. No LV hypertrophy. Normal global systolic

LV function. EF is 73 %. No

regional wall motion abnormality. Normal diastolic LV function.  

Right Ventricle: 

Normal size right ventricle. Normal RV function.  

Left Atrium: 

The left atrium is normal in size. An agitated saline study was

performed and was negative for

intracardiac shunting.  

Right Atrium: 

The right atrium is normal in size.  

Mitral Valve: 

The mitral valve is normal in appearance and function. There is no

mitral valve regurgitation.

Aortic Valve: 

The aortic valve is normal in appearance and function. There is no

aortic valve regurgitation. No

aortic valve stenosis is present.  

Tricuspid Valve: 

The tricuspid valve is normal in appearance and function. There is no

significant tricuspid valve

regurgitation. Pulmonary artery pressure is not obtained due to

inadequate TR jet.

Pulmonic Valve: 

The pulmonic valve is normal in appearance and function.  

Aorta: 

The aorta is normal. Normal size aortic root measuring 1.8 cm.  

Pericardium: 

No pericardial effusion. 







Electronically signed by Bassam Beckham on 2017 at 12:18 PM 

(No Signature Object) 



Patient: ZARI PANDYA                         MRN: [id number]

Study Date: 2017   Page 2 of 2

11:27 AM 







[id number]

## 2017-11-02 NOTE — GCON
[f 
rep st]



                                                                    CONSULTATION





NEUROLOGY CONSULTATION



DATE OF CONSULTATION:  11/02/2017



REFERRING PHYSICIAN:  Keagan Pappas MD





BILLING INFORMATION: seventy  total minutes on the floor today and reviewing 
records, neuro imaging and in direct counseling with the patient along with 
coordination of care.



CHIEF COMPLAINT:  Amnesia.



HISTORY OF PRESENT ILLNESS:  The patient is a very pleasant 72-year-old lady 
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 



She recently came home from a trip from [place] and states she was 
dehydrated.  Then yesterday morning, she and her  were working quite 
vigorously with an orbital elmira to sand off the paint from their external 
home to repaint it themselves.  She states she was applying a great deal of 
pressure with her chest and upper extremities while standing on a ladder using 
her core muscles along with repetitive kneeling and standing up.  Certainly, 
she had repetitive Valsalva type maneuvers. 



Then, a little later in the morning, she began having a feeling of "fuzziness" 
and minimal headache symptoms.  Her  then noticed that the patient was 
unable to form new memories for a period of around 3-5 hours in total.  She 
would repeat questions such as why are we having lunch although they just 
spoken about it a moment previously and repeat the same questions over and 
over.  At no point - did she have loss or alteration of consciousness.



She was brought to the ER and, after 1 or 2 hours in the ER, the symptoms 
completely resolved.  There was never any focal weakness, sensory loss, 
language or motor speech disturbance at any time during these events.  The 
patient had an MRI brain which showed no acute findings.  No acute infarct or 
hemorrhage.  The brain looks fairly healthy in terms of microvascular changes, 
there is minimal.  She had a carotid ultrasound which showed no flow-limiting 
stenosis in the carotid system.  There is some minimal plaque.  She is 
essentially resolved overnight and doing well.



REVIEW OF SYSTEMS:  Ten-point review of system was done and only pertinent to 
HPI.



PAST MEDICAL HISTORY:  Please see Dr. Pappas's history and physical.



SOCIAL HISTORY:  Please see Dr. Pappas's history and physical.



HOME MEDICATIONS:  Please see Dr. Pappas's history and physical.



ALLERGIES:  Please see Dr. Pappas's history and physical



PHYSICAL EXAMINATION:  VITAL SIGNS:  Blood pressure is 127/77, temperature 36.9
, heart rate is 64, O2 saturation is 95%.  GENERAL:  In no acute distress.  
Very pleasant.  NEUROLOGIC:  Higher mental function.  She names 5/5, follows 
commands 5/5, and repeats 5/5.  No aphasia.  She is lucid.  Cranial nerve exam 
normal 2 through 7, 11, and 12.  No dysarthria or any other abnormal findings.  
Motor exam normal strength tone and DTRs throughout.  Sensory normal to light 
touch throughout.  Coordination normal.



IMPRESSION AND PLAN:  



1. Transient global amnesia. 



The patient's clinical history best fits with transient global amnesia (TGA).  
I am reassured that there were no focal motor, sensory, language or motor 
speech symptoms to suggest an acute neurovascular syndrome.  Moreover, her MRI 
brain was negative in terms of diffusion-weighted images hours into symptom 
onset.  All of this would support the diagnosis of transient global amnesia.  
She has no neurologic history such as seizures or migraines.  Her activity of 
vigorous work, sanding the paint from their home is not an uncommon historical 
feature preceding an episode of transient global amnesia.  She was counseled to 
such.  We will put her on 2 weeks of driving restrictions and seizure 
precautions for safety.  I will arrange for an outpatient EEG and follow up 
after that study is complete to review the results.  We will do this to exclude 
any epileptiform abnormalities.  From my standpoint, she can discharge from the 
hospital any time back to home.  We will arrange outpatient followup.  Thank 
for this consultation.



The patient will have close follow-up with PCP as well to monitor her blood 
pressures.  Her higher blood pressures in the ED may have been reactive rather 
than causative.  But certainly this needs to be followed up on a long-term 
basis with her primary care physician.  She is agreeable.



Job #:  [id number]

MTDD

## 2017-11-02 NOTE — GDS
[f 
rep st]



                                                             DISCHARGE SUMMARY





DISCHARGE DIAGNOSIS:  

1.  Transient global amnesia.

2.  Hypertension.



BRIEF HISTORY:  The patient is a 72-year-old female with a history of 
hyperlipidemia.  She presented to the emergency department with acute short-
term memory problems and felt like she was lost while gardening.  She did not 
remember what she was doing or why she was there.  She had no word-finding 
difficulties.  She was seen and evaluated by Neurology who noted that she has 
transient global amnesia.  An MRI of her brain showed nothing acute.  Her 
carotid Doppler showed no flow-limiting stenosis.  Recommendations are no 
driving x2 week and seizure precautions.



HOSPITAL COURSE BY PROBLEM:  

1.  Transient global amnesia.  Her symptoms have resolved.  On the telemetry 
monitor, she has been in sinus rhythm.  To follow up with Dr. Kelly and get an 
EEG.

2.  Hypertension.  Follow up with her PCP.  It is stable this afternoon.



DISCHARGE CONDITION:  Stable. 



VITAL SIGNS: Blood pressure is 115/67, respiratory rate is 14, pulse is 58, 
temperature 36.7 Celsius, O2 sats on room air 94%.



MEDICATIONS AT DISCHARGE:  Please see the EMR.



DISCHARGE INSTRUCTIONS:  

1.  No driving x2 weeks.

2.  To take seizure precautions.  For example, no climbing up ladders and 
swimming alone.

3.  To follow up with Dr. Kelly.  She will need EEG evaluation.



Copy requested to:

Dr. Robin Omalley



Job #:  [id number]

MTDD

## 2017-11-02 NOTE — GDS
[f 
rep st]



                                                             DISCHARGE SUMMARY





DISCHARGE DIAGNOSIS:  

1.  Transient global amnesia.

2.  Hypertension.



BRIEF HISTORY:  The patient is a 72-year-old female with a history of 
hyperlipidemia.  She presented to the emergency department with acute short-
term memory problems and felt like she was lost while gardening.  She did not 
remember what she was doing or why she was there.  She had no word-finding 
difficulties.  She was seen and evaluated by Neurology who noted that she has 
transient global amnesia.  An MRI of her brain showed nothing acute.  Her 
carotid Doppler showed no flow-limiting stenosis.  Recommendations are no 
driving x2 week and seizure precautions.



HOSPITAL COURSE BY PROBLEM:  

1.  Transient global amnesia.  Her symptoms have resolved.  On the telemetry 
monitor, she has been in sinus rhythm.  To follow up with Dr. Kelly and get an 
EEG.

2.  Hypertension.  Follow up with her PCP.  It is stable this afternoon.



DISCHARGE CONDITION:  Stable. 



VITAL SIGNS: Blood pressure is 115/67, respiratory rate is 14, pulse is 58, 
temperature 36.7 Celsius, O2 sats on room air 94%.



MEDICATIONS AT DISCHARGE:  Please see the EMR.



DISCHARGE INSTRUCTIONS:  

1.  No driving x2 weeks.

2.  To take seizure precautions.  For example, no climbing up ladders and 
swimming alone.

3.  To follow up with Dr. Kelly.  She will need EEG evaluation.



Copy requested to:

Dr. Robin Omalley



Job #:  [id number]

MTDD

## 2017-11-02 NOTE — GCON
[f 
rep st]



                                                                    CONSULTATION





NEUROLOGY CONSULTATION



DATE OF CONSULTATION:  11/02/2017



REFERRING PHYSICIAN:  Keagan Pappas MD





BILLING INFORMATION: seventy  total minutes on the floor today and reviewing 
records, neuro imaging and in direct counseling with the patient along with 
coordination of care.



CHIEF COMPLAINT:  Amnesia.



HISTORY OF PRESENT ILLNESS:  The patient is a very pleasant 72-year-old lady 
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 



She recently came home from a trip from [place] and states she was 
dehydrated.  Then yesterday morning, she and her  were working quite 
vigorously with an orbital elmira to sand off the paint from their external 
home to repaint it themselves.  She states she was applying a great deal of 
pressure with her chest and upper extremities while standing on a ladder using 
her core muscles along with repetitive kneeling and standing up.  Certainly, 
she had repetitive Valsalva type maneuvers. 



Then, a little later in the morning, she began having a feeling of "fuzziness" 
and minimal headache symptoms.  Her  then noticed that the patient was 
unable to form new memories for a period of around 3-5 hours in total.  She 
would repeat questions such as why are we having lunch although they just 
spoken about it a moment previously and repeat the same questions over and 
over.  At no point - did she have loss or alteration of consciousness.



She was brought to the ER and, after 1 or 2 hours in the ER, the symptoms 
completely resolved.  There was never any focal weakness, sensory loss, 
language or motor speech disturbance at any time during these events.  The 
patient had an MRI brain which showed no acute findings.  No acute infarct or 
hemorrhage.  The brain looks fairly healthy in terms of microvascular changes, 
there is minimal.  She had a carotid ultrasound which showed no flow-limiting 
stenosis in the carotid system.  There is some minimal plaque.  She is 
essentially resolved overnight and doing well.



REVIEW OF SYSTEMS:  Ten-point review of system was done and only pertinent to 
HPI.



PAST MEDICAL HISTORY:  Please see Dr. Pappas's history and physical.



SOCIAL HISTORY:  Please see Dr. Pappas's history and physical.



HOME MEDICATIONS:  Please see Dr. Pappas's history and physical.



ALLERGIES:  Please see Dr. Pappas's history and physical



PHYSICAL EXAMINATION:  VITAL SIGNS:  Blood pressure is 127/77, temperature 36.9
, heart rate is 64, O2 saturation is 95%.  GENERAL:  In no acute distress.  
Very pleasant.  NEUROLOGIC:  Higher mental function.  She names 5/5, follows 
commands 5/5, and repeats 5/5.  No aphasia.  She is lucid.  Cranial nerve exam 
normal 2 through 7, 11, and 12.  No dysarthria or any other abnormal findings.  
Motor exam normal strength tone and DTRs throughout.  Sensory normal to light 
touch throughout.  Coordination normal.



IMPRESSION AND PLAN:  



1. Transient global amnesia. 



The patient's clinical history best fits with transient global amnesia (TGA).  
I am reassured that there were no focal motor, sensory, language or motor 
speech symptoms to suggest an acute neurovascular syndrome.  Moreover, her MRI 
brain was negative in terms of diffusion-weighted images hours into symptom 
onset.  All of this would support the diagnosis of transient global amnesia.  
She has no neurologic history such as seizures or migraines.  Her activity of 
vigorous work, sanding the paint from their home is not an uncommon historical 
feature preceding an episode of transient global amnesia.  She was counseled to 
such.  We will put her on 2 weeks of driving restrictions and seizure 
precautions for safety.  I will arrange for an outpatient EEG and follow up 
after that study is complete to review the results.  We will do this to exclude 
any epileptiform abnormalities.  From my standpoint, she can discharge from the 
hospital any time back to home.  We will arrange outpatient followup.  Thank 
for this consultation.



The patient will have close follow-up with PCP as well to monitor her blood 
pressures.  Her higher blood pressures in the ED may have been reactive rather 
than causative.  But certainly this needs to be followed up on a long-term 
basis with her primary care physician.  She is agreeable.



Job #:  [id number]

MTDD

## 2018-10-05 ENCOUNTER — HOSPITAL ENCOUNTER (OUTPATIENT)
Dept: HOSPITAL 80 - FIMAGING | Age: 73
End: 2018-10-05
Attending: OBSTETRICS & GYNECOLOGY
Payer: COMMERCIAL

## 2018-10-05 DIAGNOSIS — Z12.31: Primary | ICD-10-CM
